# Patient Record
Sex: MALE | URBAN - METROPOLITAN AREA
[De-identification: names, ages, dates, MRNs, and addresses within clinical notes are randomized per-mention and may not be internally consistent; named-entity substitution may affect disease eponyms.]

---

## 2024-08-01 ENCOUNTER — CLINICAL DOCUMENTATION (OUTPATIENT)
Age: 74
End: 2024-08-01

## 2024-08-01 NOTE — PROGRESS NOTES
Ref received - NP appt ref by Marco A Hampton dx Cirrhosis of Liver w/o Ascites schedule next available

## 2024-08-28 ENCOUNTER — CLINICAL DOCUMENTATION (OUTPATIENT)
Age: 74
End: 2024-08-28

## 2024-08-28 NOTE — PROGRESS NOTES
Received records from Marco A Hampton DO from [facility]. Dx: Cirrhosis without ascites. Further review not needed. Pls schedule next available routine appt